# Patient Record
Sex: FEMALE | Race: WHITE | Employment: FULL TIME | URBAN - METROPOLITAN AREA
[De-identification: names, ages, dates, MRNs, and addresses within clinical notes are randomized per-mention and may not be internally consistent; named-entity substitution may affect disease eponyms.]

---

## 2018-10-30 LAB
CREATININE, EXTERNAL: 0.78
HBA1C MFR BLD HPLC: 7 %
LDL-C, EXTERNAL: 145
MICROALBUMIN UR TEST STR-MCNC: 7.9 MG/DL

## 2019-02-08 ENCOUNTER — OFFICE VISIT (OUTPATIENT)
Dept: ENDOCRINOLOGY | Age: 59
End: 2019-02-08

## 2019-02-08 VITALS
HEIGHT: 60 IN | DIASTOLIC BLOOD PRESSURE: 78 MMHG | HEART RATE: 86 BPM | SYSTOLIC BLOOD PRESSURE: 130 MMHG | WEIGHT: 196 LBS | BODY MASS INDEX: 38.48 KG/M2

## 2019-02-08 DIAGNOSIS — E05.90 HYPERTHYROIDISM: Primary | ICD-10-CM

## 2019-02-08 RX ORDER — ROSUVASTATIN CALCIUM 5 MG/1
TABLET, COATED ORAL
COMMUNITY

## 2019-02-08 RX ORDER — CHOLECALCIFEROL (VITAMIN D3) 125 MCG
CAPSULE ORAL
COMMUNITY

## 2019-02-08 RX ORDER — SITAGLIPTIN AND METFORMIN HYDROCHLORIDE 50; 1000 MG/1; MG/1
TABLET, FILM COATED, EXTENDED RELEASE ORAL
COMMUNITY
Start: 2019-02-07

## 2019-02-08 RX ORDER — GUAIFENESIN 100 MG/5ML
81 LIQUID (ML) ORAL DAILY
COMMUNITY

## 2019-02-08 RX ORDER — LISINOPRIL 20 MG/1
TABLET ORAL
COMMUNITY
Start: 2019-02-06

## 2019-02-08 NOTE — PATIENT INSTRUCTIONS
1. Plan for blood work today,     2. I will call with results and plan as soon as its available. 3. Plan to return in 3 months with thyroid labs to be collected at the lab 3 days prior. Call with concerns,     Heri Escalona.  [address] [facility] Endocrinology  [facility] mydala

## 2019-02-08 NOTE — PROGRESS NOTES
CONSULTATION REQUESTED BY: Domo Dougherty MD     REASON FOR CONSULT: Evaluation of hyperthyroidism    CHIEF COMPLAINT: Hyperthyroidism    HISTORY OF PRESENT ILLNESS:   Ileana Rodriguez is a 59 y.o. female with a PMHx as noted below who was referred to our endocrinology clinic for evaluation of Hyperthyroidism. Patient had thyroid levels checked with her primary clinic. TSH was just below normal, and remained there on repeat. Interestingly patient has history of HYPOthyroidism about 10 year ago,  Had her levothyroxine gradually reduced until completely off 3 years ago,  Notably family history for thyroid disorders is positive for hypothyroidism in father,  Patient denies recent illness. She denies the use of any steroid or opioid medications. Patient denies palpitations, tremors. She denies weight loss. Blood pressure and heart rate today are normal.  Patient has no further concerns and is hoping to learn what is causing this thyroid dysfunction. Review of most recent thyroid function:  No results found for: TSH, FT4, [id number], T3LT, 422841, TSILT, TRALT, TMCLT, TSHEXT   Thyroid Lab Key:  TSILT = Thyroid stimulating antibodies  TRALT = TSH Receptor Antibodies  TMCLT = TPO antibodies  T3LT = Total T3 levels  066678 = Direct FT4  654290 = Free T3    PAST MEDICAL/SURGICAL HISTORY:   No past medical history on file. No past surgical history on file.     ALLERGIES:   Allergies   Allergen Reactions    Azo [Phenazopyridine] Hives    Daypro [Oxaprozin] Hives    Erythromycin Hives    Levaquin [Levofloxacin] Other (comments)     Joint pain    Macrobid [Nitrofurantoin Monohyd/M-Cryst] Hives    Pcn [Penicillins] Hives    Sulfa (Sulfonamide Antibiotics) Hives    Tetracycline Hives       MEDICATIONS ON ADMISSION:     Current Outpatient Medications:     lisinopril (PRINIVIL, ZESTRIL) 20 mg tablet, , Disp: , Rfl:     JANUMET XR 50-1,000 mg TM24, , Disp: , Rfl:     rosuvastatin (CRESTOR) 5 mg tablet, Take  by mouth nightly., Disp: , Rfl:     cholecalciferol, vitamin D3, (VITAMIN D3) 2,000 unit tab, Take  by mouth., Disp: , Rfl:     aspirin 81 mg chewable tablet, Take 81 mg by mouth daily. , Disp: , Rfl:     SOCIAL HISTORY:   Social History     Socioeconomic History    Marital status: UNKNOWN     Spouse name: Not on file    Number of children: Not on file    Years of education: Not on file    Highest education level: Not on file   Social Needs    Financial resource strain: Not on file    Food insecurity - worry: Not on file    Food insecurity - inability: Not on file   [employer] needs - medical: Not on file   [employer] needs - non-medical: Not on file   Occupational History    Not on file   Tobacco Use    Smoking status: Never Smoker    Smokeless tobacco: Never Used   Substance and Sexual Activity    Alcohol use: Not on file    Drug use: Not on file    Sexual activity: Not on file   Other Topics Concern    Not on file   Social History Narrative    Not on file       FAMILY HISTORY:  Family History   Problem Relation Age of Onset    Cancer Mother     Arthritis-osteo Father     Bleeding Prob Father     Cancer Father     Elevated Lipids Father     Heart Disease Father     Hypertension Father     Elevated Lipids Paternal Grandfather        REVIEW OF SYSTEMS: Complete ROS assessed and noted for that which is described above, all else are negative.   Eyes: normal  ENT: normal  CVS: normal  Resp: normal  GI: normal  : normal  GYN: normal  Endocrine: normal  Integument: normal  Musculoskeletal: normal  Neuro: normal  Psych: normal      PHYSICAL EXAMINATION:    VITAL SIGNS:  Visit Vitals  /78 (BP 1 Location: Left arm, BP Patient Position: Sitting)   Pulse 86   Ht 5' (1.524 m)   Wt 196 lb (88.9 kg)   BMI 38.28 kg/m²       GENERAL: NCAT, Sitting comfortably, NAD  EYES: EOMI, non-icteric, no proptosis  Ear/Nose/Throat: NCAT, no inflammation, thyroid gland is smooth and not enlarged, nodularity is not felt on palpation. LYMPH NODES: No LAD  CARDIOVASCULAR: S1 S2, RRR, No murmur, 2+ radial pulses  RESPIRATORY: CTA b/l, no wheeze/rales  GASTROINTESTINAL: soft, NT, ND  MUSCULOSKELETAL: Normal ROM, no atrophy  SKIN: warm, no edema/rash/ or other skin changes  NEUROLOGIC: 5/5 power all extremities, AAOx3, no tremor   PSYCHIATRIC: Normal affect, Normal insight and judgement      REVIEW OF LABORATORY AND RADIOLOGY DATA:   Labs and documentation have been reviewed as described above. ASSESSMENT AND PLAN:   Rita Reina is a 59 y.o. female with a PMHx as noted above who was referred to our endocrinology clinic for evaluation of hyperthyroidism. Hyperthyroidism    Patients history is interesting, having had hypothyroidism on a full dose of levothyroxine that had been tapered off over the years, and then recently with a borderline low TSH of 0.4. Notably she is currently asymptomatic and does not have an appreciable goiter on exam. It is not clear if this could be related to a thyroiditis, or thyroid stimulating antibodies, although the first step would be to repeat it today since its been several months, and see where she is currently. We will check for autoantibodies and determine her risk of progressive thyroid disease that would warrant treatment. Plan as below. Today we will obtain:  TSH, FT4, TT3, Thyroid autoantibody panel  I have advised the patient that based on these results which I will discuss with them by phone, we will determine the best next step to take. Blood pressure and heart rate are currently normal, no indication for beta blocker at this time. Plan to have patient RTC in 2 months with pre-labs,  (she will be traveling to [facility] sometime around then so will be working it into her schedule)    Jordon Mccain.  [address] [facility] Endocrinology  [address] [place] [address]

## 2019-02-10 LAB
T3 SERPL-MCNC: 117 NG/DL (ref 71–180)
T4 FREE SERPL-MCNC: 1.28 NG/DL (ref 0.82–1.77)
THYROGLOB AB SERPL-ACNC: <1 IU/ML (ref 0–0.9)
THYROPEROXIDASE AB SERPL-ACNC: 20 IU/ML (ref 0–34)
TSH SERPL DL<=0.005 MIU/L-ACNC: 0.63 UIU/ML (ref 0.45–4.5)
TSI ACT/NOR SER: 2.52 IU/L (ref 0–0.55)

## 2019-02-11 NOTE — PROGRESS NOTES
Thyroid levels are normal, however she does have + TSI antibodies suggestive of underlying graves disease,  Although she has had HYPOthyroidism, previously, it is possible this was offset by the presents of + TSI. Thyroid levels are currently normal with TSH of 0.63 but will need monitoring as may need treatment with methimazole in the future,  Patient to repeat thyroid levels before next visit as ordered,   I called patient and discussed the above with her,     Lalito Arredondo.  [address] [facility]  [address] [place] [place] [address]

## 2019-05-24 ENCOUNTER — OFFICE VISIT (OUTPATIENT)
Dept: ENDOCRINOLOGY | Age: 59
End: 2019-05-24

## 2019-05-24 VITALS
HEIGHT: 60 IN | HEART RATE: 80 BPM | WEIGHT: 191 LBS | DIASTOLIC BLOOD PRESSURE: 69 MMHG | BODY MASS INDEX: 37.5 KG/M2 | SYSTOLIC BLOOD PRESSURE: 106 MMHG

## 2019-05-24 DIAGNOSIS — E05.90 HYPERTHYROIDISM: Primary | ICD-10-CM

## 2019-05-24 RX ORDER — ROSUVASTATIN CALCIUM 20 MG/1
TABLET, COATED ORAL
Refills: 1 | COMMUNITY
Start: 2019-05-17

## 2019-05-24 RX ORDER — ESTRADIOL 0.1 MG/G
CREAM VAGINAL
COMMUNITY

## 2019-05-24 NOTE — PATIENT INSTRUCTIONS
Plan to return to clinic in 6 months,     Call if you start having hyperthyroid symptoms,     Blood work few days before visit,       Shannon PINZON.  [address] Endocrinology  [address] [place] [address]

## 2019-05-24 NOTE — PROGRESS NOTES
CHIEF COMPLAINT: f/u evaluation for hyperthyroidism. HISTORY OF PRESENT ILLNESS:   Logan Turner is a 59 y.o. female with a PMHx as noted below who presents to the endocrinology clinic for f/u evaluation of hyperthyroidism. Patient had thyroid levels checked with her primary clinic initially,  TSH was just below normal, and remained there on repeat until her visit with me,  Interestingly patient has history of HYPOthyroidism about 10 year ago,  Had her levothyroxine gradually reduced until completely off around 2016. During her visit with me she was noted for + TSI and low normal TSH of 0.63,  We noted that this would be important to monitor as her hypothyroidism was evolving toward hyperthyroidism,  She had blood work completed recently with her primary clinic,   Outside labs reviewed: 5/17/19: TSH 0.539, FT4 1.36, TT3 123,   Patient denies palpitations, reports intentional weight loss, denies tremors,   Denies hx of low bone density, reports normal DXA around 2016,   Review of other most recent thyroid function:    Lab Results   Component Value Date    TSH 0.630 02/08/2019    FT4 1.28 02/08/2019    T3LT 117 02/08/2019    TSILT 2.52 (H) 02/08/2019    TMCLT 20 02/08/2019      Thyroid Lab Key:  TSILT = Thyroid stimulating antibodies  TRALT = TSH Receptor Antibodies  TMCLT = TPO antibodies  T3LT = Total T3 levels  976292 = Direct FT4  798307 = Free T3    PAST MEDICAL/SURGICAL HISTORY:   No past medical history on file. No past surgical history on file.     ALLERGIES:   Allergies   Allergen Reactions    Azo [Phenazopyridine] Hives    Daypro [Oxaprozin] Hives    Erythromycin Hives    Levaquin [Levofloxacin] Other (comments)     Joint pain    Macrobid [Nitrofurantoin Monohyd/M-Cryst] Hives    Pcn [Penicillins] Hives    Sulfa (Sulfonamide Antibiotics) Hives    Tetracycline Hives       MEDICATIONS ON ADMISSION:     Current Outpatient Medications:     rosuvastatin (CRESTOR) 20 mg tablet, TK 1 T PO QD, Disp: , Rfl: 1    lisinopril (PRINIVIL, ZESTRIL) 20 mg tablet, , Disp: , Rfl:     JANUMET XR 50-1,000 mg TM24, , Disp: , Rfl:     cholecalciferol, vitamin D3, (VITAMIN D3) 2,000 unit tab, Take  by mouth., Disp: , Rfl:     aspirin 81 mg chewable tablet, Take 81 mg by mouth daily. , Disp: , Rfl:     estradiol (ESTRACE) 0.01 % (0.1 mg/gram) vaginal cream, Estrace 0.01% (0.1 mg/gram) vaginal cream  1 rodney pv 2-3 x per week at hs  BIN#: [id number], PCN#: [id number], GRP#: [id number], ID#: [id number], can be run through secondary insurance, Disp: , Rfl:     rosuvastatin (CRESTOR) 5 mg tablet, Take  by mouth nightly., Disp: , Rfl:     SOCIAL HISTORY:   Social History     Socioeconomic History    Marital status: UNKNOWN     Spouse name: Not on file    Number of children: Not on file    Years of education: Not on file    Highest education level: Not on file   Occupational History    Not on file   Social Needs    Financial resource strain: Not on file    Food insecurity:     Worry: Not on file     Inability: Not on file    Transportation needs:     Medical: Not on file     Non-medical: Not on file   Tobacco Use    Smoking status: Never Smoker    Smokeless tobacco: Never Used   Substance and Sexual Activity    Alcohol use: Not on file    Drug use: Not on file    Sexual activity: Not on file   Lifestyle    Physical activity:     Days per week: Not on file     Minutes per session: Not on file    Stress: Not on file   Relationships    Social connections:     Talks on phone: Not on file     Gets together: Not on file     Attends [employer] service: Not on file     Active member of club or organization: Not on file     Attends meetings of clubs or organizations: Not on file     Relationship status: Not on file    Intimate partner violence:     Fear of current or ex partner: Not on file     Emotionally abused: Not on file     Physically abused: Not on file     Forced sexual activity: Not on file   Other Topics Concern    Not on file   Social History Narrative    Not on file       FAMILY HISTORY:  Family History   Problem Relation Age of Onset    Cancer Mother     Arthritis-osteo Father     Bleeding Prob Father     Cancer Father     Elevated Lipids Father     Heart Disease Father     Hypertension Father     Elevated Lipids Paternal Grandfather        REVIEW OF SYSTEMS: Complete ROS assessed and noted for that which is described above, all else are negative. Eyes: normal  ENT: normal  CVS: normal  Resp: normal  GI: normal  : normal  GYN: normal  Endocrine: normal  Integument: normal  Musculoskeletal: normal  Neuro: normal  Psych: normal      PHYSICAL EXAMINATION:    VITAL SIGNS:  Visit Vitals  /69 (BP 1 Location: Left arm, BP Patient Position: Sitting)   Pulse 80   Ht 5' (1.524 m)   Wt 191 lb (86.6 kg)   BMI 37.30 kg/m²       GENERAL: NCAT, Sitting comfortably, NAD  EYES: EOMI, non-icteric, no proptosis  Ear/Nose/Throat: NCAT, no inflammation  LYMPH NODES: No LAD  CARDIOVASCULAR: S1 S2, RRR, No murmur, 2+ radial pulses  RESPIRATORY: CTA b/l, no wheeze/rales  GASTROINTESTINAL: soft, NT, ND,  MUSCULOSKELETAL: Normal ROM, no atrophy  SKIN: warm, no edema/rash/ or other skin changes  NEUROLOGIC: 5/5 power all extremities, no tremors, AAOx3  PSYCHIATRIC: Normal affect, Normal insight and judgement    REVIEW OF LABORATORY AND RADIOLOGY DATA:   Labs and documentation have been reviewed as described above. ASSESSMENT AND PLAN:   Evy Parish is a 59 y.o. female with a PMHx as noted above who presents to the endocrinology clinic for f/u evaluation of hyperthyroidism. Hyperthyroidism    Patient with + TSI suggestive of underlying graves disease. She has progressed over time from hypothyroidism to hyperthyroidism due to the predominance of stimulating hormone compared with the typical chronic thyroiditis seen in hashimoto thyroiditis.  Currently, although her TSH remains lower part of the normal range, she is asymptomatic without symptoms of hyperthyroidism, anxiety, or low bone density. In her case it is reasonable to monitor. I would consider however starting methimazole at a low dose if her TSH drops below normal again, due to the risk of atrial fibrillation in the community among patients with hyperthyroidism and even in those with \"subclinical\" hyperthyroidism. She will touch base with me if has any new symptoms, in the meantime we will have her follow up in 6 months. 6 month follow up with Alexander millard  [address] [facility] Endocrinology  [address] [place] [address]

## 2019-11-22 LAB
T4 FREE SERPL-MCNC: 1.28 NG/DL (ref 0.82–1.77)
TSH SERPL DL<=0.005 MIU/L-ACNC: 0.51 UIU/ML (ref 0.45–4.5)

## 2019-11-25 ENCOUNTER — OFFICE VISIT (OUTPATIENT)
Dept: ENDOCRINOLOGY | Age: 59
End: 2019-11-25

## 2019-11-25 VITALS
DIASTOLIC BLOOD PRESSURE: 69 MMHG | WEIGHT: 183 LBS | HEART RATE: 63 BPM | BODY MASS INDEX: 35.93 KG/M2 | HEIGHT: 60 IN | SYSTOLIC BLOOD PRESSURE: 118 MMHG

## 2019-11-25 DIAGNOSIS — E05.90 HYPERTHYROIDISM: Primary | ICD-10-CM

## 2019-11-25 NOTE — PROGRESS NOTES
CHIEF COMPLAINT: f/u evaluation for hyperthyroidism. HISTORY OF PRESENT ILLNESS:   Fabrizio Hines is a 62 y.o. female with a PMHx as noted below who presents to the endocrinology clinic for f/u evaluation of hyperthyroidism. Patient had thyroid levels checked with her primary clinic initially,  TSH was just below normal, and remained there on repeat until her visit with me,  Interestingly patient has history of HYPOthyroidism about 10 year ago,  Had her levothyroxine gradually reduced until completely off around 2016. During her visit with me she was noted for + TSI and low normal TSH of 0.63,  We continued to monitor as her TSH remained normal,  Patient denies palpitations, or tremors,  She did loose another 8 pounds, again reporting intentional weight loss,  Denies hx of low bone density, reports normal DXA around 2016,   Thyroid levels most recently remained normal however,    Lab Results   Component Value Date    TSH 0.508 11/21/2019    TSH 0.630 02/08/2019    FT4 1.28 11/21/2019    FT4 1.28 02/08/2019    T3LT 117 02/08/2019    TSILT 2.52 (H) 02/08/2019    TMCLT 20 02/08/2019      Thyroid Lab Key:  TSILT = Thyroid stimulating antibodies  TRALT = TSH Receptor Antibodies  TMCLT = TPO antibodies  T3LT = Total T3 levels  [id number] = Direct FT4  368000 = Free T3    PAST MEDICAL/SURGICAL HISTORY:   No past medical history on file. No past surgical history on file.     ALLERGIES:   Allergies   Allergen Reactions    Azo [Phenazopyridine] Hives    Daypro [Oxaprozin] Hives    Erythromycin Hives    Levaquin [Levofloxacin] Other (comments)     Joint pain    Macrobid [Nitrofurantoin Monohyd/M-Cryst] Hives    Pcn [Penicillins] Hives    Sulfa (Sulfonamide Antibiotics) Hives    Tetracycline Hives       MEDICATIONS ON ADMISSION:     Current Outpatient Medications:     rosuvastatin (CRESTOR) 20 mg tablet, TK 1 T PO QD, Disp: , Rfl: 1    lisinopril (PRINIVIL, ZESTRIL) 20 mg tablet, , Disp: , Rfl:     JANUMET XR 50-1,000 mg TM24, , Disp: , Rfl:     cholecalciferol, vitamin D3, (VITAMIN D3) 2,000 unit tab, Take  by mouth., Disp: , Rfl:     aspirin 81 mg chewable tablet, Take 81 mg by mouth daily. , Disp: , Rfl:     estradiol (ESTRACE) 0.01 % (0.1 mg/gram) vaginal cream, Estrace 0.01% (0.1 mg/gram) vaginal cream  1 rodney pv 2-3 x per week at hs  BIN#: [id number], PCN#: [id number], GRP#: [id number], ID#: [id number], can be run through secondary insurance, Disp: , Rfl:     rosuvastatin (CRESTOR) 5 mg tablet, Take  by mouth nightly., Disp: , Rfl:     SOCIAL HISTORY:   Social History     Socioeconomic History    Marital status: UNKNOWN     Spouse name: Not on file    Number of children: Not on file    Years of education: Not on file    Highest education level: Not on file   Occupational History    Not on file   Social Needs    Financial resource strain: Not on file    Food insecurity:     Worry: Not on file     Inability: Not on file    Transportation needs:     Medical: Not on file     Non-medical: Not on file   Tobacco Use    Smoking status: Never Smoker    Smokeless tobacco: Never Used   Substance and Sexual Activity    Alcohol use: Not on file    Drug use: Not on file    Sexual activity: Not on file   Lifestyle    Physical activity:     Days per week: Not on file     Minutes per session: Not on file    Stress: Not on file   Relationships    Social connections:     Talks on phone: Not on file     Gets together: Not on file     Attends [employer] service: Not on file     Active member of club or organization: Not on file     Attends meetings of clubs or organizations: Not on file     Relationship status: Not on file    Intimate partner violence:     Fear of current or ex partner: Not on file     Emotionally abused: Not on file     Physically abused: Not on file     Forced sexual activity: Not on file   Other Topics Concern    Not on file   Social History Narrative    Not on file       FAMILY HISTORY:  Family History Problem Relation Age of Onset    Cancer Mother     Arthritis-osteo Father     Bleeding Prob Father     Cancer Father     Elevated Lipids Father     Heart Disease Father     Hypertension Father     Elevated Lipids Paternal Grandfather        REVIEW OF SYSTEMS: Complete ROS assessed and noted for that which is described above, all else are negative. Eyes: normal  ENT: normal  CVS: normal  Resp: normal  GI: normal  : normal  GYN: normal  Endocrine: normal  Integument: normal  Musculoskeletal: normal  Neuro: normal  Psych: normal      PHYSICAL EXAMINATION:    VITAL SIGNS:  Visit Vitals  /69 (BP 1 Location: Left arm, BP Patient Position: Sitting)   Pulse 63   Ht 5' (1.524 m)   Wt 183 lb (83 kg)   BMI 35.74 kg/m²       GENERAL: NCAT, Sitting comfortably, NAD  EYES: EOMI, non-icteric, no proptosis  Ear/Nose/Throat: NCAT, no inflammation  LYMPH NODES: No LAD  CARDIOVASCULAR: S1 S2, RRR, No murmur, 2+ radial pulses  RESPIRATORY: CTA b/l, no wheeze/rales  GASTROINTESTINAL: soft, NT, ND,  MUSCULOSKELETAL: Normal ROM, no atrophy  SKIN: warm, no edema/rash/ or other skin changes  NEUROLOGIC: 5/5 power all extremities, no tremors, AAOx3  PSYCHIATRIC: Normal affect, Normal insight and judgement    REVIEW OF LABORATORY AND RADIOLOGY DATA:   Labs and documentation have been reviewed as described above. ASSESSMENT AND PLAN:   Anabella Brambila is a 62 y.o. female with a PMHx as noted above who presents to the endocrinology clinic for f/u evaluation of hyperthyroidism. Hyperthyroidism    Patient with + TSI suggestive of underlying graves disease. She has progressed over time from hypothyroidism to hyperthyroidism due to the predominance of stimulating hormone compared with the typical chronic thyroiditis seen in hashimoto thyroiditis. Currently, although her TSH remains lower part of the normal range, she is asymptomatic without symptoms of hyperthyroidism, anxiety, or low bone density.  In her case it is reasonable to monitor. I would consider however starting methimazole at a low dose if her TSH drops below normal again, due to the risk of atrial fibrillation in the community among patients with hyperthyroidism and even in those with \"subclinical\" hyperthyroidism. She will touch base with me if has any new symptoms, in the meantime we will have her follow up in 6 months. The above remains true, and we will continue with the same plan. Note that she continues to loose weight intentionally however there will come a point in time where we must consider whether her weight loss is a result of her thyroid and may warrant treatment. 6 month follow up with Ye millard  39 [facility] Endocrinology  [address] [place] [address]

## 2019-11-25 NOTE — PATIENT INSTRUCTIONS
Plan to return to clinic in 6 months,  
 
Call if you start having hyperthyroid symptoms, Blood work few days before visit, Andrew Chaidez. [address] [facility] [address]

## 2020-05-23 LAB
T3 SERPL-MCNC: 120 NG/DL (ref 71–180)
T4 FREE SERPL-MCNC: 1.14 NG/DL (ref 0.82–1.77)
TSH SERPL DL<=0.005 MIU/L-ACNC: 0.38 UIU/ML (ref 0.45–4.5)

## 2020-05-29 ENCOUNTER — VIRTUAL VISIT (OUTPATIENT)
Dept: ENDOCRINOLOGY | Age: 60
End: 2020-05-29

## 2020-05-29 DIAGNOSIS — E05.90 HYPERTHYROIDISM: Primary | ICD-10-CM

## 2020-05-29 RX ORDER — METHIMAZOLE 5 MG/1
5 TABLET ORAL DAILY
Qty: 90 TAB | Refills: 3 | Status: SHIPPED | OUTPATIENT
Start: 2020-05-29

## 2020-05-29 NOTE — PROGRESS NOTES
**DUE TO PANDEMIC AND HEALTH CONCERNS IN THE COMMUNITY, THIS PATIENT WAS EITHER ILL OR FOUND TO BE HIGH RISK FOR IN-PERSON EVALUATION WITHIN THE CLINIC. THE FOLLOWING IS A VIRTUAL TELEMEDICINE VIDEO ENCOUNTER VIA [employer], TO WHICH THE PATIENT AGREED. THE PURPOSE IS TO LIMIT INTERRUPTIONS IN HEALTHCARE AND TO PROVIDE FOR ONGOING URGENT NEEDS UNDER THE CURRENT CONDITIONS. CHIEF COMPLAINT: f/u evaluation for hyperthyroidism. HISTORY OF PRESENT ILLNESS:   Beatriz Weiss is a 62 y.o. female with a PMHx as noted below who presents to the endocrinology clinic for f/u evaluation of hyperthyroidism. Patient had thyroid levels checked with her primary clinic initially,  TSH was just below normal, and remained there on repeat until her visit with me,  Interestingly patient has history of HYPOthyroidism about 10 year ago,  Had her levothyroxine gradually reduced until completely off around 2016. During her visit with me she was noted for + TSI and low normal TSH of 0.63,  We continued to monitor as her TSH remained normal,  She had been loosing some weight and her TSH had been low normal,  We opted to monitor before starting therapy with methimazole,  Denies hx of low bone density, reports normal DXA around 2016,   As of 5/22/20, her TSH did drop to 0.376,   Patient reports sleep is affected by menopause,   Denies palpitations, reports her weight is down but stable, around 176 lbs,    Lab Results   Component Value Date    TSH 0.376 (L) 05/22/2020    TSH 0.508 11/21/2019    TSH 0.630 02/08/2019    FT4 1.14 05/22/2020    FT4 1.28 11/21/2019    FT4 1.28 02/08/2019    T3LT 120 05/22/2020    T3LT 117 02/08/2019    TSILT 2.52 (H) 02/08/2019    TMCLT 20 02/08/2019      Thyroid Lab Key:  TSILT = Thyroid stimulating antibodies  TRALT = TSH Receptor Antibodies  TMCLT = TPO antibodies  T3LT = Total T3 levels  589486 = Direct FT4  570883 = Free T3    PAST MEDICAL/SURGICAL HISTORY:   No past medical history on file.   No past surgical history on file. ALLERGIES:   Allergies   Allergen Reactions    Azo [Phenazopyridine] Hives    Daypro [Oxaprozin] Hives    Erythromycin Hives    Levaquin [Levofloxacin] Other (comments)     Joint pain    Macrobid [Nitrofurantoin Monohyd/M-Cryst] Hives    Pcn [Penicillins] Hives    Sulfa (Sulfonamide Antibiotics) Hives    Tetracycline Hives       MEDICATIONS ON ADMISSION:     Current Outpatient Medications:     rosuvastatin (CRESTOR) 20 mg tablet, TK 1 T PO QD, Disp: , Rfl: 1    lisinopril (PRINIVIL, ZESTRIL) 20 mg tablet, , Disp: , Rfl:     JANUMET XR 50-1,000 mg TM24, , Disp: , Rfl:     aspirin 81 mg chewable tablet, Take 81 mg by mouth daily. , Disp: , Rfl:     estradiol (ESTRACE) 0.01 % (0.1 mg/gram) vaginal cream, Estrace 0.01% (0.1 mg/gram) vaginal cream  1 rodney pv 2-3 x per week at hs  BIN#: [id number], PCN#: [id number], GRP#: [id number], ID#: [id number], can be run through secondary insurance, Disp: , Rfl:     rosuvastatin (CRESTOR) 5 mg tablet, Take  by mouth nightly., Disp: , Rfl:     cholecalciferol, vitamin D3, (VITAMIN D3) 2,000 unit tab, Take  by mouth., Disp: , Rfl:     SOCIAL HISTORY:   Social History     Socioeconomic History    Marital status: UNKNOWN     Spouse name: Not on file    Number of children: Not on file    Years of education: Not on file    Highest education level: Not on file   Occupational History    Not on file   Social Needs    Financial resource strain: Not on file    Food insecurity     Worry: Not on file     Inability: Not on file   [employer] needs     Medical: Not on file     Non-medical: Not on file   Tobacco Use    Smoking status: Never Smoker    Smokeless tobacco: Never Used   Substance and Sexual Activity    Alcohol use: Not on file    Drug use: Not on file    Sexual activity: Not on file   Lifestyle    Physical activity     Days per week: Not on file     Minutes per session: Not on file    Stress: Not on file   Relationships    Social connections     Talks on phone: Not on file     Gets together: Not on file     Attends Muslim service: Not on file     Active member of club or organization: Not on file     Attends meetings of clubs or organizations: Not on file     Relationship status: Not on file    Intimate partner violence     Fear of current or ex partner: Not on file     Emotionally abused: Not on file     Physically abused: Not on file     Forced sexual activity: Not on file   Other Topics Concern    Not on file   Social History Narrative    Not on file       FAMILY HISTORY:  Family History   Problem Relation Age of Onset    Cancer Mother     Arthritis-osteo Father     Bleeding Prob Father     Cancer Father     Elevated Lipids Father     Heart Disease Father     Hypertension Father     Elevated Lipids Paternal Grandfather        REVIEW OF SYSTEMS: Complete ROS assessed and noted for that which is described above, all else are negative. Eyes: normal  ENT: normal  CVS: normal  Resp: normal  GI: normal  : normal  GYN: normal  Endocrine: normal  Integument: normal  Musculoskeletal: normal  Neuro: normal  Psych: normal    PHYSICAL EXAMINATION:  Telemedicine Visit    GENERAL: NCAT, Appears well nourished  EYES: EOMI, non-icteric, no proptosis  Ear/Nose/Throat: NCAT, no visible inflammation or masses  CARDIOVASCULAR: no cyanosis, no visible JVD  RESPIRATORY: comfortable respirations observed, no cyanosis  MUSCULOSKELETAL: Normal ROM of upper extremities observed  SKIN: No edema, rash, or other significant changes observed  NEUROLOGIC:  AAOx3  PSYCHIATRIC: Normal affect, Normal insight and judgement    REVIEW OF LABORATORY AND RADIOLOGY DATA:   Labs and documentation have been reviewed as described above. ASSESSMENT AND PLAN:   Sara Lara is a 62 y.o. female with a PMHx as noted above who presents to the endocrinology clinic for f/u evaluation of hyperthyroidism.      Hyperthyroidism    Patient with + TSI suggestive of underlying graves disease. She has progressed over time from hypothyroidism to hyperthyroidism due to the predominance of stimulating hormone compared with the typical chronic thyroiditis seen in hashimoto thyroiditis. While we had waited and monitored previously, her TSH is now abnormally low and she is agreeable to a trial of low dose methimazole, noting that we also discussed the adverse effects of untreated hyperthyroidism. Plan as noted below:    Start methimazole: 5mg tablets  Take one tab by mouth daily for 2 weeks,   Then reduce to 1/2 tab (2.5mg) once daily until next visit,     3 month follow up with venice, Aug 28 at 2:50 (Maybe moving to [place] in July, may call for sooner appt)    25 minutes spent toward telemedicine visit today of which >50% of this time was spent in counseling and coordination of care. Yuliya Nielsen.  [facility]  [address] [place] [address]

## 2020-06-16 ENCOUNTER — TELEPHONE (OUTPATIENT)
Dept: ENDOCRINOLOGY | Age: 60
End: 2020-06-16

## 2020-06-16 NOTE — TELEPHONE ENCOUNTER
----- Message from Allison Mcgregor sent at 6/16/2020  3:53 PM EDT -----  Regarding: Dr Lacy Staley  General Message/Vendor Calls    Caller's first and last name:      Reason for call:pt cancelled her appt on 8/28/2020 [place] is [place] of [place] in [place] and Dr Aditya Vilchis wanted to get some labs to see how the new medication is working that started taking on June 6th       Callback required yes/no and why:yes for reason given above       Best contact number(s):[phone]      Details to clarify the request:she wanted to know if doing labs in July is  too early or should she wait until she is in [place] and have them drawn in August, also she checking  on her med rec request she was told by the nurse since it is only 4 visits, they could be mailed to her home in [place].        Allison Mcgregor

## 2020-06-17 NOTE — TELEPHONE ENCOUNTER
I returned this call and relayed the message from Dr. Naseem Eason. I also let Ms. Shashank know that I mailed out the 4 office visits today as well. She will give us a call when she has the labs done in [facility].   Celeste Wheeler

## 2020-06-17 NOTE — TELEPHONE ENCOUNTER
Labs should be few days before her Aug appt. , she can do the labs anywhere using the order sheet, which can be mailed to her if needed, as long as the results are forwarded to us for review before her appt. She should also seek her result from the lab if completed in [place] to share with me in case they do not send it to us. Alejandro Avalos.  [address] [place] [address]